# Patient Record
Sex: FEMALE | Race: BLACK OR AFRICAN AMERICAN
[De-identification: names, ages, dates, MRNs, and addresses within clinical notes are randomized per-mention and may not be internally consistent; named-entity substitution may affect disease eponyms.]

---

## 2019-03-29 ENCOUNTER — HOSPITAL ENCOUNTER (EMERGENCY)
Dept: HOSPITAL 74 - JER | Age: 29
Discharge: HOME | End: 2019-03-29
Payer: COMMERCIAL

## 2019-03-29 VITALS — HEART RATE: 69 BPM | TEMPERATURE: 98.5 F | DIASTOLIC BLOOD PRESSURE: 75 MMHG | SYSTOLIC BLOOD PRESSURE: 129 MMHG

## 2019-03-29 VITALS — BODY MASS INDEX: 27.3 KG/M2

## 2019-03-29 DIAGNOSIS — J02.0: Primary | ICD-10-CM

## 2019-03-29 NOTE — PDOC
*Physical Exam





- Vital Signs


 Last Vital Signs











Temp Pulse Resp BP Pulse Ox


 


 98.5 F   69   20   129/75   99 


 


 03/29/19 04:54  03/29/19 04:54  03/29/19 04:54  03/29/19 04:54  03/29/19 04:54














ED Treatment Course





- Medications


Given in the ED: 


ED Medications














Discontinued Medications














Generic Name Dose Route Start Last Admin





  Trade Name Mayra  PRN Reason Stop Dose Admin


 


Dexamethasone  10 mg  03/29/19 05:15  03/29/19 05:50





  Decadron Liquid -  PO  03/29/19 05:16  10 mg





  ONCE ONE   Administration





     





     





     





     














Medical Decision Making





- Medical Decision Making





03/29/19 05:56


Case discussed with NP Paul


Agree with assessment and plan





*DC/Admit/Observation/Transfer


Diagnosis at time of Disposition: 


 Strep pharyngitis








- Discharge Dispostion


Disposition: HOME


Condition at time of disposition: Stable





- Prescriptions


Prescriptions: 


Amox-Tr/K Cl [Augmentin - 875Mg Tablet] 1 tab PO BID #20 tablet





- Referrals


Referrals: 


Allegra Tubbs MD [Primary Care Provider] - 





- Patient Instructions


Additional Instructions: 


Take amoxicillin as prescribed.


Salt water garggles.


Throw away your toothbrush in 3 days and start using a new toothbrush.


No sharing of drinks, utensils or toothbrushes.


Take Motrin as directed by 's instructions.


Return to ED for worsening fevers, worsening sore throat, chest pain, shortness 

of breath or any other concerns.





The gonorrhea and chlamydia testing will not be completed for the next few 

days. 


You may call [phone] and leave message for return phone call with lab 

results. Be sure to be clear with your name, birthdate, and phone number





Always use condoms with the partners


Followup with OB/GYN or PMD  in one week for reevaluation and retesting.





- Post Discharge Activity


Forms/Work/School Notes:  Back to Work

## 2019-03-29 NOTE — PDOC
History of Present Illness





- General


Chief Complaint: Sore Throat


Stated Complaint: SORE THROAT,EARACHE


Time Seen by Provider: 03/29/19 04:53


History Source: Patient


Exam Limitations: No Limitations





- History of Present Illness


Initial Comments: 





03/29/19 05:17





HISTORY OF PRESENT ILLNESS: This is a 28-year-old woman who presents emergency 

department for evaluation of left-sided sore throat and left ear pain over the 

past "couple of days." Patient reports the pain continues to get worse and is 

now noticed vocal changes. She denies any discharge or drainage from the ears 

or inability to swallow. She denies any shortness of breath.





No recent travel or sick contacts. 


PAST MEDICAL HISTORY: Denies past medical history


SURGICAL HISTORY: Denies


ALLERGIES: No known drug allergies





REVIEW OF SYSTEMS


General/Constitutional: Denies fever or chills. Denies weakness, weight change.


HEENT: see HPI


Cardiovascular: Denies chest pain or shortness of breath.


Respiratory: Denies cough, wheezing, or hemoptysis.


Gastrointestinal: Denies nausea, vomiting, diarrhea or constipation. Denies 

rectal bleeding.


Genitourinary: Denies dysuria, frequency, or change in urination.


Musculoskeletal: Denies joint or muscle swelling or pain. Denies neck or back 

pain.


Skin and breasts: Denies rash or easy bruising.


Neurologic: Denies headache, vertigo, loss of consciousness, or loss of 

sensation.


Psychiatric: Denies depression or anxiety.


Endocrine: Denies increased thirst. Denies abnormal weight change.


Hematologic/Lymphatic: Denies anemia, easy bleeding, or history of blood clots.


Allergic/Immunologic: Denies hives or skin allergy. Denies latex allergy.











PHYSICAL EXAM


General Appearance: Well-appearing, appropriately dressed.  No apparent distress

, no intoxication.


HEENT: EOMI, PERRLA, Left-sided pre-tonsillar swelling present. Uvula deviated 

towards the right. Trismus present. TMs normal.  No conjunctival pallor.  No 

photophobia, scleral icterus.


Neck: Supple.  Trachea midline. No tenderness, rigidity, carotid bruit, stridor

, lymphadenopathy, or thyromegaly. 


Respiratory/Chest: Lungs CTAB.  No shortness of breath, chest tenderness, 

respiratory distress, accessory muscle use. No crackles, rales, rhonchi, stridor

, wheezing, dullness


Cardiovascular: RRR. S1, S2.  No JVD, murmur, bradycardia, tachycardia.


Neurologic: CNs II-XII intact. Fully oriented, alert.  Appropriate mood/affect. 

Motor strength 5/5.  No appreciable EOM palsy, facial droop or sensory deficit.





Past History





- Past Medical History


Allergies/Adverse Reactions: 


 Allergies











Allergy/AdvReac Type Severity Reaction Status Date / Time


 


No Known Allergies Allergy   Verified 03/29/19 04:50











Home Medications: 


Ambulatory Orders





Amox-Tr/K Cl [Augmentin - 875Mg Tablet] 1 tab PO BID #20 tablet 03/29/19 








Anemia: Yes


COPD: No





- Immunization History


Immunization Up to Date: Yes





- Suicide/Smoking/Psychosocial Hx


Smoking Status: No


Smoking History: Never smoked


Have you smoked in the past 12 months: No


Number of Cigarettes Smoked Daily: 0


Information on smoking cessation initiated: No


Hx Alcohol Use: No


Drug/Substance Use Hx: No





*Physical Exam





- Vital Signs


 Last Vital Signs











Temp Pulse Resp BP Pulse Ox


 


 98.5 F   69   20   129/75   99 


 


 03/29/19 04:54  03/29/19 04:54  03/29/19 04:54  03/29/19 04:54  03/29/19 04:54














ED Treatment Course





- RADIOLOGY


Radiology Studies Ordered: 














 Category Date Time Status


 


 FACIAL BONES CT WITH CONTRAST [CT] Stat CT Scan  03/29/19 05:15 Ordered














Medical Decision Making





- Medical Decision Making





03/29/19 05:26


A/P:


28-year-old female with strep throat vs left PTA





CT of the facial bones with IV contrast


Decadron 10 mg orally


Urine pregnancy testing


BMP


03/29/19 05:45


Bedside ultrasound performed by Dr. Francisco which revealed no collection. I will 

discharge the patient home with prescription for Augmentin 875 mg to be taken 

twice a day for the next 10 days.


03/29/19 05:51








*DC/Admit/Observation/Transfer


Diagnosis at time of Disposition: 


 Strep pharyngitis








- Discharge Dispostion


Disposition: HOME


Condition at time of disposition: Stable


Decision to Admit order: No





- Prescriptions


Prescriptions: 


Amox-Tr/K Cl [Augmentin - 875Mg Tablet] 1 tab PO BID #20 tablet





- Referrals


Referrals: 


Allegra Tubbs MD [Primary Care Provider] - 





- Patient Instructions


Additional Instructions: 


Take amoxicillin as prescribed.


Salt water garggles.


Throw away your toothbrush in 3 days and start using a new toothbrush.


No sharing of drinks, utensils or toothbrushes.


Take Motrin as directed by 's instructions.


Return to ED for worsening fevers, worsening sore throat, chest pain, shortness 

of breath or any other concerns.





The gonorrhea and chlamydia testing will not be completed for the next few 

days. 


You may call [phone] and leave message for return phone call with lab 

results. Be sure to be clear with your name, birthdate, and phone number





Always use condoms with the partners


Followup with OB/GYN or PMD  in one week for reevaluation and retesting.





- Post Discharge Activity


Forms/Work/School Notes:  Back to Work

## 2019-04-01 ENCOUNTER — HOSPITAL ENCOUNTER (EMERGENCY)
Dept: HOSPITAL 74 - JERFT | Age: 29
Discharge: HOME | End: 2019-04-01
Payer: COMMERCIAL

## 2019-04-01 VITALS — BODY MASS INDEX: 26.1 KG/M2

## 2019-04-01 VITALS — TEMPERATURE: 98.3 F | DIASTOLIC BLOOD PRESSURE: 78 MMHG | HEART RATE: 85 BPM | SYSTOLIC BLOOD PRESSURE: 131 MMHG

## 2019-04-01 DIAGNOSIS — A54.5: Primary | ICD-10-CM

## 2019-04-01 NOTE — PDOC
Patient Follow-up (Call Back)





- Post ED Follow - Up


Condition at time of discharge: Stable


Disposition at time of original discharge: HOME


Reason for Call Back: Abnwl. Lab (Patient have positive pharngeal gonorrhea.  

Called at home, returned call, informed of postive results, instructed to 

return to emergency department or go to primary physician for treatment)

## 2019-04-01 NOTE — PDOC
History of Present Illness





- General


Chief Complaint: Revisit, Lab Variance


Stated Complaint: RETURN FOR RESULT


Time Seen by Provider: 04/01/19 19:19





- History of Present Illness


Initial Comments: 





04/01/19 20:00


28-year-old female presents for reevaluation of sore throat. She has a lab 

variance positive pharyngeal gonorrhea.





Past History





- Past Medical History


Allergies/Adverse Reactions: 


 Allergies











Allergy/AdvReac Type Severity Reaction Status Date / Time


 


No Known Allergies Allergy   Verified 03/29/19 04:50











Home Medications: 


Ambulatory Orders





Amox-Tr/K Cl [Augmentin - 875Mg Tablet] 1 tab PO BID #20 tablet 03/29/19 








Anemia: Yes


COPD: No





- Immunization History


Immunization Up to Date: Yes





- Suicide/Smoking/Psychosocial Hx


Smoking Status: No


Smoking History: Unknown if ever smoked


Have you smoked in the past 12 months: No


Number of Cigarettes Smoked Daily: 0


Hx Alcohol Use: No


Drug/Substance Use Hx: No





**Review of Systems





- Review of Systems


Constitutional: No: Fever


HEENTM: Yes: Throat Pain





*Physical Exam





- Vital Signs


 Last Vital Signs











Temp Pulse Resp BP Pulse Ox


 


 98.3 F   85   20   131/78   100 


 


 04/01/19 19:20  04/01/19 19:20  04/01/19 19:20  04/01/19 19:20  04/01/19 19:20














- Physical Exam


Comments: 





04/01/19 20:00


HEAD: NC/AT


EYES: Conjuntiva clear


NOSE: No d/c


THROAT: Moist mucous membrances, oral pharanx erythemic mild exudate, uvula 

midline


MS: Full ROM in all joints without edema 


NEUROLOGIC: No gross sensory or motor deficits, NVID


SKIN: Normal color and temperature no lesions or rashes





Medical Decision Making





- Medical Decision Making





04/01/19 20:01


We'll treat with Rocephin and Zithromax follow-up with primary care physician.


04/01/19 20:03


Pt deferred other std testing RPR and HIV to PCP 





*DC/Admit/Observation/Transfer


Diagnosis at time of Disposition: 


 Acute gonococcal pharyngitis








- Discharge Dispostion


Disposition: HOME


Condition at time of disposition: Stable


Decision to Admit order: No





- Referrals


Referrals: 


Allegra Tubbs MD [Primary Care Provider] - 





- Patient Instructions


Printed Discharge Instructions:  DI for Gonorrhea, Gonorrhea, Gonorrhea


Additional Instructions: 


Today were treated for gonorrhea and chlamydia. Please follow-up with your 

primary care physician for HIV and syphilis testing. Return to the emergency 

room should you have any further issues.





- Post Discharge Activity

## 2019-04-01 NOTE — PDOC
Rapid Medical Evaluation


Time Seen by Provider: 04/01/19 19:19


Medical Evaluation: 


 Allergies











Allergy/AdvReac Type Severity Reaction Status Date / Time


 


No Known Allergies Allergy   Verified 03/29/19 04:50











04/01/19 19:19


I have performed a brief in-person evaluation of this patient 





The patient present with a chief complaint of:


recalled for treatment.  Patient recalled to ed for treatment of 


positive pharyngeal gonorrhea





Pertinent physical exam findings: 


nad, speech clear


even and unlabored breathing 





I have ordered the following:


ceftriaxone and azithromycin


The patient will proceed to the ED for further evaluation.